# Patient Record
Sex: FEMALE | Race: WHITE | Employment: FULL TIME | URBAN - METROPOLITAN AREA
[De-identification: names, ages, dates, MRNs, and addresses within clinical notes are randomized per-mention and may not be internally consistent; named-entity substitution may affect disease eponyms.]

---

## 2019-10-18 ENCOUNTER — OFFICE VISIT (OUTPATIENT)
Dept: FAMILY MEDICINE | Facility: CLINIC | Age: 34
End: 2019-10-18
Payer: COMMERCIAL

## 2019-10-18 VITALS
OXYGEN SATURATION: 96 % | TEMPERATURE: 98.6 F | SYSTOLIC BLOOD PRESSURE: 104 MMHG | HEART RATE: 75 BPM | WEIGHT: 155.2 LBS | DIASTOLIC BLOOD PRESSURE: 65 MMHG

## 2019-10-18 DIAGNOSIS — Z11.1 TUBERCULOSIS SCREENING: Primary | ICD-10-CM

## 2019-10-18 DIAGNOSIS — Z23 IMMUNIZATION, VARICELLA: ICD-10-CM

## 2019-10-18 DIAGNOSIS — Z23 NEED FOR PROPHYLACTIC VACCINATION AND INOCULATION AGAINST INFLUENZA: ICD-10-CM

## 2019-10-18 PROCEDURE — 90686 IIV4 VACC NO PRSV 0.5 ML IM: CPT | Performed by: INTERNAL MEDICINE

## 2019-10-18 PROCEDURE — 99202 OFFICE O/P NEW SF 15 MIN: CPT | Mod: 25 | Performed by: INTERNAL MEDICINE

## 2019-10-18 PROCEDURE — 86787 VARICELLA-ZOSTER ANTIBODY: CPT | Performed by: INTERNAL MEDICINE

## 2019-10-18 PROCEDURE — 86481 TB AG RESPONSE T-CELL SUSP: CPT | Performed by: INTERNAL MEDICINE

## 2019-10-18 PROCEDURE — 90471 IMMUNIZATION ADMIN: CPT | Performed by: INTERNAL MEDICINE

## 2019-10-18 PROCEDURE — 36415 COLL VENOUS BLD VENIPUNCTURE: CPT | Performed by: INTERNAL MEDICINE

## 2019-10-18 RX ORDER — DROSPIRENONE AND ETHINYL ESTRADIOL 0.03MG-3MG
1 KIT ORAL DAILY
Refills: 1 | COMMUNITY
Start: 2019-09-28

## 2019-10-18 NOTE — LETTER
[facility]  [address] [facility]  [address]  [place]  [address]  Tel: [phone]    October 24, 2019    Mechelle Lan  [address] [facility] [address]        Dear Ms. Lan,    The results of your recent QuantiFERON-TB gold plus screen for TB was negative.  If you have any further questions or problems, please contact our office.      Sincerely,    Dr.Kamil Shahid          Enclosure: Lab Results  Results for orders placed or performed in visit on 10/18/19   Varicella Zoster Virus Antibody IgG   Result Value Ref Range    Varicella Zoster Virus Antibody IgG 2.9 (H) 0.0 - 0.8 AI   Quantiferon TB Gold Plus   Result Value Ref Range    Quantiferon-TB Gold Plus Result Negative NEG^Negative    TB1 Ag minus Nil Value 0.00 IU/mL    TB2 Ag minus Nil Value 0.02 IU/mL    Mitogen minus Nil Result >10.00 IU/mL    Nil Result 0.04 IU/mL

## 2019-10-19 LAB — VZV IGG SER QL IA: 2.9 AI (ref 0–0.8)

## 2019-10-21 LAB
GAMMA INTERFERON BACKGROUND BLD IA-ACNC: 0.04 IU/ML
M TB IFN-G BLD-IMP: NEGATIVE
M TB IFN-G CD4+ BCKGRND COR BLD-ACNC: >10 IU/ML
MITOGEN IGNF BCKGRD COR BLD-ACNC: 0 IU/ML
MITOGEN IGNF BCKGRD COR BLD-ACNC: 0.02 IU/ML

## 2019-10-24 ENCOUNTER — TELEPHONE (OUTPATIENT)
Dept: FAMILY MEDICINE | Facility: CLINIC | Age: 34
End: 2019-10-24

## 2019-10-24 NOTE — TELEPHONE ENCOUNTER
Left VM for patient to callback [facility]  Scarlett MOY RN      Notes recorded by Memo Shahid MD on 10/23/2019 at 7:21 PM CDT  Please reassure patient QuantiFERON-TB gold plus screen for TB was negative.  Dr. Shahid

## 2019-10-25 NOTE — TELEPHONE ENCOUNTER
Patient was notified with information noted by provider and agreed with plan.  Routing to team, please send results in the mail.    SUZIE Allison, RN  [facility]

## 2020-12-07 ENCOUNTER — APPOINTMENT (OUTPATIENT)
Dept: URBAN - METROPOLITAN AREA CLINIC 256 | Age: 35
Setting detail: DERMATOLOGY
End: 2020-12-07

## 2020-12-07 VITALS — RESPIRATION RATE: 16 BRPM | WEIGHT: 152 LBS | HEIGHT: 64 IN

## 2020-12-07 DIAGNOSIS — B36.0 PITYRIASIS VERSICOLOR: ICD-10-CM

## 2020-12-07 DIAGNOSIS — L81.4 OTHER MELANIN HYPERPIGMENTATION: ICD-10-CM

## 2020-12-07 DIAGNOSIS — D22 MELANOCYTIC NEVI: ICD-10-CM

## 2020-12-07 PROBLEM — D22.5 MELANOCYTIC NEVI OF TRUNK: Status: ACTIVE | Noted: 2020-12-07

## 2020-12-07 PROCEDURE — OTHER LIQUID NITROGEN (COSMETIC): OTHER

## 2020-12-07 PROCEDURE — 99203 OFFICE O/P NEW LOW 30 MIN: CPT

## 2020-12-07 PROCEDURE — OTHER COUNSELING: OTHER

## 2020-12-07 PROCEDURE — OTHER PRESCRIPTION: OTHER

## 2020-12-07 RX ORDER — KETOCONAZOLE 200 MG/1
TABLET ORAL AS DIRECTED
Qty: 20 | Refills: 1 | Status: ERX | COMMUNITY
Start: 2020-12-07

## 2020-12-07 ASSESSMENT — LOCATION SIMPLE DESCRIPTION DERM
LOCATION SIMPLE: UPPER BACK
LOCATION SIMPLE: RIGHT CHEEK
LOCATION SIMPLE: ABDOMEN

## 2020-12-07 ASSESSMENT — LOCATION DETAILED DESCRIPTION DERM
LOCATION DETAILED: RIGHT SUPERIOR CENTRAL BUCCAL CHEEK
LOCATION DETAILED: RIGHT INFERIOR CENTRAL MALAR CHEEK
LOCATION DETAILED: EPIGASTRIC SKIN
LOCATION DETAILED: INFERIOR THORACIC SPINE

## 2020-12-07 ASSESSMENT — LOCATION ZONE DERM
LOCATION ZONE: TRUNK
LOCATION ZONE: FACE

## 2020-12-07 NOTE — PROCEDURE: LIQUID NITROGEN (COSMETIC)
Price (Use Numbers Only, No Special Characters Or $): 50.00
Post-Care Instructions: I reviewed with the patient in detail post-care instructions. Patient is to wear sunprotection, and avoid picking at any of the treated lesions. Pt may apply Vaseline to crusted or scabbing areas.
Billing Information: Bill by Static Price
Detail Level: Detailed
Consent: The patient's consent was obtained including but not limited to risks of crusting, scabbing, blistering, scarring, darker or lighter pigmentary change, recurrence, incomplete removal and infection. The patient understands that the procedure is cosmetic in nature and is not covered by insurance.
Render Post-Care Instructions In Note?: yes

## 2020-12-07 NOTE — HPI: UPPER BODY SKIN CHECK
How Severe Are Your Spot(S)?: mild
What Is The Reason For Today's Visit?: Skin Lesion
Additional History: Patient has had Tinea Versicolor since her teen years. She believes that is what she is experiencing today as well.

## 2021-01-03 ENCOUNTER — HEALTH MAINTENANCE LETTER (OUTPATIENT)
Age: 36
End: 2021-01-03

## 2021-03-07 ENCOUNTER — HEALTH MAINTENANCE LETTER (OUTPATIENT)
Age: 36
End: 2021-03-07

## 2021-10-10 ENCOUNTER — HEALTH MAINTENANCE LETTER (OUTPATIENT)
Age: 36
End: 2021-10-10

## 2022-01-30 ENCOUNTER — HEALTH MAINTENANCE LETTER (OUTPATIENT)
Age: 37
End: 2022-01-30

## 2022-09-18 ENCOUNTER — HEALTH MAINTENANCE LETTER (OUTPATIENT)
Age: 37
End: 2022-09-18

## 2022-12-19 NOTE — PROGRESS NOTES
Subjective     Mechelle Lan is a 34 year old female who presents to clinic today for the following health issues: SHE IS NOT HERE TO ESTABLISH CARE AND SHE DOES NOT WANT A PHYSICAL    HPI   Work requirements  - vaccines and immunity check  Tb testing  MMR ,Hep B and Varicella   Influenza vaccine         There is no problem list on file for this patient.    History reviewed. No pertinent surgical history.    Social History     Tobacco Use     Smoking status: Never Smoker     Smokeless tobacco: Never Used   Substance Use Topics     Alcohol use: Not on file     History reviewed. No pertinent family history.      Current Outpatient Medications   Medication Sig Dispense Refill     BRITTANY 3-0.03 MG tablet Take 1 tablet by mouth daily  1     Allergies   Allergen Reactions     No Known Allergies      No lab results found.   BP Readings from Last 3 Encounters:   10/18/19 104/65    Wt Readings from Last 3 Encounters:   10/18/19 70.4 kg (155 lb 3.2 oz)              Reviewed and updated as needed this visit by Provider  Tobacco  Meds  Med Hx  Surg Hx  Fam Hx  Soc Hx        Review of Systems   ROS COMP: Constitutional, HEENT, cardiovascular, pulmonary, gi and gu systems are negative, except as otherwise noted.      Objective    /65   Pulse 75   Temp 98.6  F (37  C) (Oral)   Wt 70.4 kg (155 lb 3.2 oz)   LMP 09/30/2019 (Exact Date)   SpO2 96%   There is no height or weight on file to calculate BMI.  Physical Exam   Patient declined    Diagnostic Test Results:  Labs reviewed in Epic        Assessment & Plan   Problem List Items Addressed This Visit     None      Visit Diagnoses     Tuberculosis screening    -  Primary    Relevant Orders    Quantiferon TB Gold Plus (Completed)    Immunization, varicella        Relevant Orders    Varicella Zoster Virus Antibody IgG (Completed)    Need for prophylactic vaccination and inoculation against influenza        Relevant Orders    INFLUENZA VACCINE IM > 6 MONTHS VALENT  IIV4 [24353] (Completed)    Vaccine Administration, Initial [32807] (Completed)         reviewed vaccines, she is UTD on Hep B, MMR    Time spent face-to-face was 20 minutes with more than 50% counseling, review of records and addressing multiple health problems as listed in Assessment Plan.    See Patient Instructions  Return in about 3 months (around 1/18/2020) for Physical Exam, PCP.    Memo Shahid MD  [facility]       Detail Level: Detailed

## 2023-05-07 ENCOUNTER — HEALTH MAINTENANCE LETTER (OUTPATIENT)
Age: 38
End: 2023-05-07

## 2025-05-01 NOTE — RESULT ENCOUNTER NOTE
Called patient and relayed message below. Patient verbalized understanding and all questions answered.     Kyree Live RN  [facility]  May 1, 2025        Positive Varicella immunity IgG positive at 2.9 AI  [NL range 0.0-0.8 AI]